# Patient Record
Sex: FEMALE | Race: OTHER | NOT HISPANIC OR LATINO | URBAN - METROPOLITAN AREA
[De-identification: names, ages, dates, MRNs, and addresses within clinical notes are randomized per-mention and may not be internally consistent; named-entity substitution may affect disease eponyms.]

---
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
## 2020-09-28 NOTE — ED ADULT NURSE NOTE - CAS TRG GEN SKIN COLOR
Assessment and Plan:     Problem List Items Addressed This Visit        Digestive    Esophageal reflux       Other    Epigastric pain      Other Visit Diagnoses     Medicare annual wellness visit, subsequent    -  Primary    Screening for breast cancer        Relevant Orders    Mammo screening bilateral w cad    Encounter for screening mammogram for malignant neoplasm of breast         Relevant Orders    Mammo screening bilateral w cad        BMI Counseling: Body mass index is 29 29 kg/m²  The BMI is above normal  Nutrition recommendations include decreasing portion sizes, encouraging healthy choices of fruits and vegetables, decreasing fast food intake, consuming healthier snacks, limiting drinks that contain sugar, moderation in carbohydrate intake, increasing intake of lean protein, reducing intake of saturated and trans fat and reducing intake of cholesterol  Exercise recommendations include exercising 3-5 times per week and strength training exercises  Preventive health issues were discussed with patient, and age appropriate screening tests were ordered as noted in patient's After Visit Summary  Personalized health advice and appropriate referrals for health education or preventive services given if needed, as noted in patient's After Visit Summary       History of Present Illness:     Patient presents for Medicare Annual Wellness visit    Patient Care Team:  Emilia Mensah MD as PCP - MD Jason Mendes MD Lavell Moron, MD Lavell Moron, MD as Endoscopist     Problem List:     Patient Active Problem List   Diagnosis    Anxiety and depression    Esophageal reflux    Insomnia    Vitamin B12 deficiency    Essential hypertension    Other specified hypothyroidism    Epigastric pain    Gastritis without bleeding    Closed fracture of distal end of left fibula with routine healing      Past Medical and Surgical History:     Past Medical History:   Diagnosis Date    Anxiety     Congenital absence of one kidney     Depression     GERD (gastroesophageal reflux disease)     History of transfusion     Hypertension     Insomnia     Menopause     Other specified hypothyroidism 1/28/2019    Vitamin B12 deficiency      Past Surgical History:   Procedure Laterality Date    COLONOSCOPY      SC COLONOSCOPY FLX DX W/COLLJ SPEC WHEN PFRMD N/A 5/30/2017    Procedure: EGD AND COLONOSCOPY;  Surgeon: Cherylene Needy, MD;  Location: [facility]; Service: Gastroenterology    SC OPEN TX DISTAL FIBULAR FRACTURE LAT MALLEOLUS Left 7/13/2020    Procedure: OPEN REDUCTION W/ INTERNAL FIXATION (ORIF) ANKLE;  Surgeon: Miguelina Bailey DO;  Location: [facility];  Service: Orthopedics    REDUCTION MAMMAPLASTY Bilateral 06/06/2011    REDUCTION MAMMAPLASTY      SHOULDER SURGERY      TONSILLECTOMY        Family History:     Family History   Problem Relation Age of Onset    Lung cancer Mother 79    Heart disease Father     Lung cancer Brother 48      Social History:        Social History     Socioeconomic History    Marital status:       Spouse name: None    Number of children: None    Years of education: None    Highest education level: None   Occupational History    None   Social Needs    Financial resource strain: None    Food insecurity     Worry: None     Inability: None    Transportation needs     Medical: None     Non-medical: None   Tobacco Use    Smoking status: Never Smoker    Smokeless tobacco: Never Used   Substance and Sexual Activity    Alcohol use: Not Currently     Alcohol/week: 7 0 standard drinks     Types: 7 Glasses of wine per week     Comment: 1 glass of wine nightly     Drug use: No    Sexual activity: None   Lifestyle    Physical activity     Days per week: None     Minutes per session: None    Stress: None   Relationships    Social connections     Talks on phone: None     Gets together: None     Attends Cheondo[employer] service: None     Active member of club or organization: None     Attends meetings of clubs or organizations: None     Relationship status: None    Intimate partner violence     Fear of current or ex partner: None     Emotionally abused: None     Physically abused: None     Forced sexual activity: None   Other Topics Concern    None   Social History Narrative    Feels safe at home      Medications and Allergies:     Current Outpatient Medications   Medication Sig Dispense Refill    ALPRAZolam (XANAX) 0 5 mg tablet TAKE 1 TABLET BY MOUTH ONCE DAILY AS NEEDED FOR ANXIETY 30 tablet 0    buPROPion (WELLBUTRIN SR) 100 mg 12 hr tablet Take 100 mg by mouth daily      escitalopram (LEXAPRO) 20 mg tablet Take 1 tablet (20 mg total) by mouth daily 90 tablet 1    Euthyrox 25 MCG tablet Take 1 tablet by mouth once daily 30 tablet 0    hyoscyamine (LEVSIN/SL) 0 125 mg SL tablet Take 1 tablet (0 125 mg total) by mouth every 4 (four) hours as needed for cramping 60 tablet 3    lisinopril (ZESTRIL) 10 mg tablet Take 1 tablet (10 mg total) by mouth daily 90 tablet 3    pantoprazole (PROTONIX) 40 mg tablet Take 1 tablet (40 mg total) by mouth daily 30 tablet 5    zolpidem (AMBIEN) 10 mg tablet TAKE 1 TABLET BY MOUTH ONCE DAILY AT BEDTIME 30 tablet 0    aspirin (ECOTRIN) 325 mg EC tablet Take 1 tablet (325 mg total) by mouth daily (Patient not taking: Reported on 9/28/2020) 28 tablet 0    buPROPion (WELLBUTRIN XL) 150 mg 24 hr tablet Take 1 tablet by mouth once daily (Patient not taking: Reported on 9/28/2020) 30 tablet 0    naloxone (NARCAN) 1 mg/mL intranasal 2 mL (2 mg total) into each nostril once for 1 dose (Patient not taking: Reported on 9/28/2020) 2 mL 0    oxyCODONE (ROXICODONE) 5 mg immediate release tablet Take 1 tablet (5 mg total) by mouth every 6 (six) hours as needed for moderate painMax Daily Amount: 20 mg (Patient not taking: Reported on 9/28/2020) 30 tablet 0     No current facility-administered medications for this visit        No Known Allergies   Immunizations: There is no immunization history on file for this patient  Health Maintenance:         Topic Date Due    MAMMOGRAM  05/20/2020    Hepatitis C Screening  Completed         Topic Date Due    DTaP,Tdap,and Td Vaccines (1 - Tdap) [birth year]/1972    Pneumococcal Vaccine: 65+ Years (1 of 1 - PPSV23) 03/09/2016    Influenza Vaccine  07/01/2020      Medicare Health Risk Assessment:     /78   Pulse 64   Temp (!) 97 °F (36 1 °C)   Resp 16   Ht 5' 5" (1 651 m)   Wt 79 8 kg (176 lb)   LMP  (LMP Unknown)   BMI 29 29 kg/m²      Rob Vines is here for her Subsequent Wellness visit  Health Risk Assessment:   Patient rates overall health as very good  Patient feels that their physical health rating is same  Eyesight was rated as same  Hearing was rated as same  Patient feels that their emotional and mental health rating is same  Pain experienced in the last 7 days has been a lot  Patient's pain rating has been 8/10  Patient states that she has experienced no weight loss or gain in last 6 months  Depression Screening:   PHQ-2 Score: 0  PHQ-9 Score: 0      Fall Risk Screening: In the past year, patient has experienced: history of falling in past year    Number of falls: 1  Injured during fall?: Yes    Feels unsteady when standing or walking?: No    Worried about falling?: No      Urinary Incontinence Screening:   Patient has not leaked urine accidently in the last six months  Home Safety:  Patient does not have trouble with stairs inside or outside of their home  Patient has working smoke alarms and has working carbon monoxide detector  Home safety hazards include: none  Dog toys    Nutrition:   Current diet is Regular  Medications:   Patient is not currently taking any over-the-counter supplements  Patient is able to manage medications       Activities of Daily Living (ADLs)/Instrumental Activities of Daily Living (IADLs):   Walk and transfer into and out of bed and chair?: Yes  Dress and groom yourself?: Yes    Bathe or shower yourself?: Yes    Feed yourself? Yes  Do your laundry/housekeeping?: Yes  Manage your money, pay your bills and track your expenses?: Yes  Make your own meals?: Yes    Do your own shopping?: Yes    Previous Hospitalizations:   Any hospitalizations or ED visits within the last 12 months?: Yes      Advance Care Planning:   Living will: Yes    Durable POA for healthcare:  Yes    Advanced directive: Yes      PREVENTIVE SCREENINGS      Cardiovascular Screening:    General: Screening Current      Diabetes Screening:     General: Screening Current      Colorectal Cancer Screening:     General: Screening Current      Breast Cancer Screening:     General: Risks and Benefits Discussed    Due for: Mammogram        Cervical Cancer Screening:    General: Screening Not Indicated      Osteoporosis Screening:    General: Risks and Benefits Discussed and Patient Declines      Abdominal Aortic Aneurysm (AAA) Screening:        General: Screening Not Indicated      Lung Cancer Screening:     General: Screening Not Indicated      Hepatitis C Screening:    General: Screening Current      Bernardino Brooks MD Normal for race